# Patient Record
Sex: MALE | Race: WHITE | NOT HISPANIC OR LATINO | URBAN - METROPOLITAN AREA
[De-identification: names, ages, dates, MRNs, and addresses within clinical notes are randomized per-mention and may not be internally consistent; named-entity substitution may affect disease eponyms.]

---

## 2024-06-09 ENCOUNTER — EMERGENCY (EMERGENCY)
Facility: HOSPITAL | Age: 24
LOS: 1 days | Discharge: HOME | End: 2024-06-09
Admitting: EMERGENCY MEDICINE
Payer: COMMERCIAL

## 2024-06-09 VITALS
TEMPERATURE: 98 F | OXYGEN SATURATION: 98 % | HEART RATE: 62 BPM | SYSTOLIC BLOOD PRESSURE: 118 MMHG | RESPIRATION RATE: 18 BRPM | DIASTOLIC BLOOD PRESSURE: 79 MMHG
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
## 2024-06-09 DIAGNOSIS — S81.852A OPEN BITE, LEFT LOWER LEG, INITIAL ENCOUNTER: ICD-10-CM
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
## 2024-06-09 DIAGNOSIS — Z20.3 CONTACT WITH AND (SUSPECTED) EXPOSURE TO RABIES: ICD-10-CM

## 2024-06-09 DIAGNOSIS — Z23 ENCOUNTER FOR IMMUNIZATION: ICD-10-CM

## 2024-06-09 PROCEDURE — 99284 EMERGENCY DEPT VISIT MOD MDM: CPT

## 2024-06-09 RX ORDER — HUMAN RABIES VIRUS IMMUNE GLOBULIN 150 [IU]/ML
1650 INJECTION, SOLUTION INTRAMUSCULAR ONCE
Refills: 0 | Status: COMPLETED | OUTPATIENT
Start: 2024-06-09 | End: 2024-06-09

## 2024-06-09 RX ORDER — BACITRACIN ZINC 500 UNIT/G
1 OINTMENT IN PACKET (EA) TOPICAL ONCE
Refills: 0 | Status: COMPLETED | OUTPATIENT
Start: 2024-06-09 | End: 2024-06-09

## 2024-06-09 RX ORDER — RABIES VACC, HUMAN DIPLOID/PF 2.5 UNIT
1 VIAL (EA) INTRAMUSCULAR ONCE
Refills: 0 | Status: COMPLETED | OUTPATIENT
Start: 2024-06-09 | End: 2024-06-09

## 2024-06-09 RX ADMIN — HUMAN RABIES VIRUS IMMUNE GLOBULIN 1650 UNIT(S): 150 INJECTION, SOLUTION INTRAMUSCULAR at 22:23

## 2024-06-09 RX ADMIN — Medication 1 APPLICATION(S): at 22:21

## 2024-06-09 RX ADMIN — Medication 1 TABLET(S): at 22:21

## 2024-06-09 RX ADMIN — Medication 1 MILLILITER(S): at 22:26

## 2024-06-09 NOTE — ED PROVIDER NOTE - CLINICAL SUMMARY MEDICAL DECISION MAKING FREE TEXT BOX
24-year-old male, presents to the emergency department after sustaining a cat bite and scratch 36 hours ago while in [place].  Patient is up-to-date with tetanus after receiving a local pharmacy today.  He is noted to have 2 small wounds on the posterior left lower leg as well as a scratch along the anterior left lower leg.  Will plan to give p.o. Augmentin in-house as well as topical bacitracin to the area.  Will give patient rabies IgG and rabies vaccination.  He is given a specific schedule to return on days 3, 7 and 14.  Patient instructed to take antibiotics as prescribed.  Return precautions discussed and patient stable as he leaves.

## 2024-06-09 NOTE — DOWNTIME INTERRUPTION NOTE - WHICH MANUAL FORMS INITIATED?
Pt triaged during downtime, care provided during downtime, please refer to scanned items for downtime documentation and orders

## 2024-06-09 NOTE — ED ADULT TRIAGE NOTE - CHIEF COMPLAINT QUOTE
Pt triaged during downtime  Pt presents to ed for rabies vaccine, reports he was bitten by a stray cat x 36 hrs ago

## 2024-06-09 NOTE — ED ADULT NURSE NOTE - OBJECTIVE STATEMENT
Patient presents with complaints of cat bite wound to left calf and scratch promise to left lower leg 36 hrs ago. Bitten by stray cat. Here for rabies vaccine and immunoglobulin. denies fever, chills, drainage from wound.

## 2024-06-09 NOTE — ED PROVIDER NOTE - PHYSICAL EXAMINATION
VITAL SIGNS: I have reviewed nursing notes and confirm.  CONSTITUTIONAL: Well-developed; well-nourished; in no acute distress.  SKIN: 2 - 1cm LLE posterior abrasions/wounds w/ overlying scabs - no DC, very small 0.5cm area of light erythema. 4cm distal LLE linear scratch, no surrounding erythema. Calves soft, DPI. FROM of extremities.  HEAD: Normocephalic; atraumatic.  CARD: No extremity cyanosis. RRR, S1S2.  RESP: Speaks in full, clear sentences. CTA jeanette. No adventitious BS.  EXT: Moves all extremities normally.  NEURO: Alert, oriented. Grossly unremarkable. No focal deficits. Fluent speech.   PSYCH: Cooperative, appropriate. Mood and affect wnl.

## 2024-06-09 NOTE — ED PROVIDER NOTE - NS ED ROS FT
+cat bite/scratch  Denies fevers, chills, nausea, vomiting, diarrhea, constipation, abdominal pain, urinary symptoms, chest pain, palpitations, shortness of breath, dyspnea on exertion, syncope/near syncope, cough/URI symptoms, headache, weakness, numbness, focal deficits, visual changes, gait or balance changes, dizziness

## 2024-06-09 NOTE — ED PROVIDER NOTE - NSFOLLOWUPINSTRUCTIONS_ED_ALL_ED_FT
You will return to the emergency room on days 3, 7, and 14 for your subsequent vaccinations.  Please take the Augmentin as prescribed.  Return to the emergency room for any acute worsening symptoms.      Rabies  Rabies is an infection that affects the brain and central nervous system. It is caused by a virus that can be carried by many kinds of animals. The virus can spread from an infected animal to a person, most often through a bite.    If you have been bitten by an animal with rabies, it is very important that you get treatment right away. Infection almost always results in death, but early treatment may prevent an infection from developing.    What are the causes?  This condition is caused by a virus that can be carried by many kinds of animals, including dogs, cats, skunks, bats, woodchucks, raccoons, coyotes, and foxes. The virus spreads through the saliva of infected animals. Most people who get rabies get it from an animal bite.    What increases the risk?  The following factors may make you more likely to develop this condition:  Working with animals that may carry the rabies virus.  Working in a lab that handles samples of the rabies virus.  Coming in contact with the virus and having a weak body defense system (immune system) that may not be able to make enough antibodies in response to the vaccine treatment.  What are the signs or symptoms?  Symptoms of this condition usually start 1–3 months after you are bitten. By the time symptoms start, it is usually too late for lifesaving treatment.  Initial symptoms may include:  Headache.  Fever.  Fatigue and weakness.  As the disease progresses, symptoms may include:  Agitation and anxiety.  Confusion.  Unusual behavior, such as hyperactivity, fear of water (hydrophobia), or fear of air (aerophobia).  Hallucinations.  Insomnia.  Weakness in the arms or legs.  Difficulty swallowing.  Most people who are treated right away will never have symptoms.    How is this diagnosed?  This condition may be diagnosed based on:  Your history of exposure to animals and animal bites.  Your symptoms.  Saliva tests.  Blood tests.  Skin test. Skin samples are taken with a needle.  Spinal fluid test. Spinal fluid samples are taken with a needle that is inserted into your back (lumbar puncture).  How is this treated?  A person receiving an injection in the upper arm.   Treatment is often started right away, even if it is not known for sure if the animal that bit you has rabies. Treatment, called post-exposure prophylaxis (PEP), aims to prevent the infection from developing. It involves:  Cleaning the wound.  Having a series of rabies vaccine injections, usually given over a 2-week period.  Getting an injection to strengthen your body's defense against the rabies virus (immune globulin).  If the animal that bit you has been caught and is alive, it will be watched to see if it remains healthy. If the animal has been killed, it can be tested for rabies.    Follow these instructions at home:  Caring for your injury    Two stitched wounds. One is normal. The other is red with pus and infected.  Follow instructions from your health care provider about how to take care of your wound. Make sure you:  Wash your hands with soap and water for at least 20 seconds before and after you change your bandage (dressing). If soap and water are not available, use hand .  Change your dressing as told by your health care provider.  Leave stitches (sutures), skin glue, or adhesive strips in place. These skin closures may need to stay in place for 2 weeks or longer. If adhesive strip edges start to loosen and curl up, you may trim the loose edges. Do not remove adhesive strips completely unless your health care provider tells you to do that.  Keep the wound dry for as long as told by your health care provider.  Check your wound every day for signs of infection. Check for:  More redness, swelling, or pain.  Fluid or blood.  Warmth.  Pus or a bad smell.  Keep the wound raised (elevated) above the level of your heart as much as possible.  Rest the injured area. Do not use the injured area until your health care provider says it is okay.  Return to your normal activities as told by your health care provider. Ask your health care provider what activities are safe for you.  General instructions    If the animal that bit you was tested for rabies, it is up to you to get the test results. Ask your health care provider, or the department that is doing the test, when the results will be ready.  Take over-the-counter and prescription medicines only as told by your health care provider.  Keep all follow-up visits. This is important.  How is this prevented?  Stay away from stray or wild animals.  Get the rabies vaccine if you:  Plan to travel to an area where rabies is common.  Have a job or hobbies that involve possible contact with wild or stray animals.  Make sure your pet stays up to date with rabies vaccinations.  Watch your pets when they are outside. Keep them away from wild animals.  Report any stray animals to the local animal control services.  Get help right away if:  You are bitten by a wild or stray animal.  You have had any direct exposure to a bat.  You have any symptoms of rabies infection.  You have any of these signs of infection:  More redness, swelling, or pain around your wound.  Fluid or blood coming from your wound.  Warmth coming from your wound.  Pus or a bad smell coming from your wound.  A fever.  Summary  Rabies is an infection that affects the brain and central nervous system.  This condition is caused by a virus that can be carried by many kinds of animals, including dogs, cats, skunks, bats, woodchucks, raccoons, coyotes, and foxes.  The virus can spread from an infected animal's saliva to a person through a bite.  If you are bitten, get treatment right away. This may prevent an infection from developing. Symptoms of an infection usually do not start until 1–3 months after you are bitten. By then it may be too late for lifesaving treatment.  This information is not intended to replace advice given to you by your health care provider. Make sure you discuss any questions you have with your health care provider.

## 2024-06-09 NOTE — ED PROVIDER NOTE - PATIENT PORTAL LINK FT
You can access the FollowMyHealth Patient Portal offered by [facility] by registering at the following website: [email or web address]. By joining [employer]’s FollowMyHealth portal, you will also be able to view your health information using other applications (apps) compatible with our system.

## 2024-06-09 NOTE — ED ADULT TRIAGE NOTE - RESPIRATORY RATE (BREATHS/MIN)
Injection/Vaccine  Supervising Provider: Perla Santana NP  Reviewed allergies and possible side effects prior to injection. The patient was held following the injection for 10 minutes. No signs or symptoms of allergic reaction we observed. Patient tolerated procedure well. Educated patient to call office with any questions or concerns. Immunization information and VIS for shingrix  vaccine(s) was reviewed; verbal consent given by patient. 18

## 2024-06-09 NOTE — ED PROVIDER NOTE - OBJECTIVE STATEMENT
24-year-old male, denies past medical history, presenting to the emergency department after sustaining a cat bite and scratch while visiting [place].  The attack occurred 36 hours ago.  Patient arrived back to [place] today and was able to receive a tetanus shot by a local pharmacy.  He came to the emergency department in order to receive the rabies vaccination and immunoglobulin.  Denies fevers, chills, leg swelling, chest pain or shortness of breath.

## 2024-06-12 ENCOUNTER — EMERGENCY (EMERGENCY)
Facility: HOSPITAL | Age: 24
LOS: 1 days | Discharge: HOME | End: 2024-06-12
Attending: EMERGENCY MEDICINE | Admitting: EMERGENCY MEDICINE
Payer: COMMERCIAL

## 2024-06-12 VITALS
HEART RATE: 62 BPM | OXYGEN SATURATION: 97 % | SYSTOLIC BLOOD PRESSURE: 123 MMHG | WEIGHT: 179.9 LBS | TEMPERATURE: 98 F | RESPIRATION RATE: 15 BRPM | HEIGHT: 70 IN | DIASTOLIC BLOOD PRESSURE: 78 MMHG

## 2024-06-12 DIAGNOSIS — Z23 ENCOUNTER FOR IMMUNIZATION: ICD-10-CM

## 2024-06-12 DIAGNOSIS — Z20.3 CONTACT WITH AND (SUSPECTED) EXPOSURE TO RABIES: ICD-10-CM

## 2024-06-12 PROCEDURE — L9995: CPT

## 2024-06-12 RX ORDER — RABIES VACC, HUMAN DIPLOID/PF 2.5 UNIT
1 VIAL (EA) INTRAMUSCULAR ONCE
Refills: 0 | Status: COMPLETED | OUTPATIENT
Start: 2024-06-12 | End: 2024-06-12

## 2024-06-12 RX ADMIN — Medication 1 MILLILITER(S): at 08:35

## 2024-06-12 NOTE — ED PROVIDER NOTE - NSFOLLOWUPINSTRUCTIONS_ED_ALL_ED_FT
1. What is rabies?    Rabies is a serious disease. It is caused by a virus.  Rabies is mainly a disease of animals. Humans get rabies when they are bitten by infected animals.  At first there might not be any symptoms. But weeks, or even months after a bite, rabies can cause pain, fatigue, headaches, fever, and irritability. These are followed by seizures, hallucinations, and paralysis. Human rabies is almost always fatal.  Wild animals—especially bats—are the most common source of human rabies infection in the [place].   Skunks, raccoons, dogs, cats, coyotes, foxes and other mammals can also transmit the disease.  Human rabies is rare in the [place]. There have been only 55 cases diagnosed since 1990.   However, between 16,000 and 39,000 people are vaccinated each year as a precaution after animal bites. Also, rabies is far more common in other parts of the world, with about 40,000–70,000 rabies-related deaths worldwide each year. Bites from unvaccinated dogs cause most of these cases.  Rabies vaccine can prevent rabies.     2. Rabies vaccine  Rabies vaccine is given to people at high risk of rabies to protect them if they are exposed. It can also prevent the disease if it is given to a person after they have been exposed.  Rabies vaccine is made from killed rabies virus. It cannot cause rabies.    3. Who should get rabies vaccine and when?  Preventive vaccination (no exposure)     People at high risk of exposure to rabies, such as veterinarians, animal handlers, rabies laboratory workers, spelunkers, and rabies biologics production workers should be offered rabies vaccine.The vaccine should also be considered for:  People whose activities bring them into frequent contact with rabies virus or with possibly rabid animals.International travelers who are likely to come in contact with animals in parts of the world where rabies is common.The pre-exposure schedule for rabies vaccination is 3 doses, given at the following times:    Dose 1: As appropriateDose 2: 7 days after Dose 1Dose 3: 21 days or 28 days after Dose 1For laboratory workers and others who may be repeatedly exposed to rabies virus, periodic testing for immunity is recommended, and booster doses should be given as needed. (Testing or booster doses are not recommended for travelers.) Ask your doctor for details.    Vaccination after an exposure    Anyone who has been bitten by an animal, or who otherwise may have been exposed to rabies, should clean the wound and see a doctor immediately. The doctor will determine if they need to be vaccinated.  A person who is exposed and has never been vaccinated against rabies should get 4 doses of rabies vaccine—one dose right away and additional doses on the 3rd, 7th, and 14th days. They should also get another shot called Rabies Immune Globulin at the same time as the first dose.  A person who has been previously vaccinated should get 2 doses of rabies vaccine—one right away and another on the 3rd day. Rabies Immune Globulin is not needed.    4. Tell your doctor if...  Talk with a doctor before getting rabies vaccine if you:  ever had a serious (life-threatening) allergic reaction to a previous dose of rabies vaccine, or to any component of the vaccine; tell your doctor if you have any severe allergies,  have a weakened immune system because of:  HIV/AIDS, or another disease that affects the immune system,treatment with drugs that affect the immune system, such as steroids,cancer, or cancer treatment with radiation or drugs.If you have a minor illness, such as a cold, you can be vaccinated. If you are moderately or severely ill, you should probably wait until you recover before getting a routine (non-exposure) dose of rabies vaccine.  If you have been exposed to rabies virus, you should get the vaccine regardless of any other illnesses you may have.    5. What are the risks from rabies vaccine?  A vaccine, like any medicine, is capable of causing serious problems, such as severe allergic reactions. The risk of a vaccine causing serious harm, or death, is extremely small. Serious problems from rabies vaccine are very rare.  Mild problems : Soreness, redness, swelling, or itching where the shot was given (30%–74%)headache, nausea, abdominal pain, muscle aches, dizziness (5%–40%)    Moderate problems : Hives, pain in the joints, fever (about 6% of booster doses)Other nervous system disorders, such as Guillain–Barré syndrome (GBS), have been reported after rabies vaccine, but this happens so rarely that it is not known whether they are related to the vaccine.    NOTE: Several brands of rabies vaccine are available in the [place], and reactions may vary between brands. Your provider can give you more information about a particular brand.    6. What if there is a serious reaction?  What should I look for?     Look for anything that concerns you, such as signs of a severe allergic reaction, very high fever, or behavior changes.Signs of a severe allergic reaction can include hives, swelling of the face and throat, difficulty breathing, a fast heartbeat, dizziness, and weakness. These would start a few minutes to a few hours after the vaccination.    What should I do?   If you think it is a severe allergic reaction or other emergency that can't wait, call 9-1-1 or get the person to the nearest hospital. Otherwise, call your doctor.Afterward, the reaction should be reported to the Vaccine Adverse Event Reporting System (VAERS). Your doctor might file this report, or you can do it yourself through the VAERS web site at www.vaers.hhs.gov, or by calling 1-508.676.8033.

## 2024-06-12 NOTE — ED ADULT NURSE NOTE - CAS EDP DISCH TYPE
Home No - the patient is unable to be screened due to medical condition ROS: CONTUSIONAL: Denies fever, chills, fatigue, wt loss. HEAD: Denies trauma, HA, Dizziness. EYE: Denies Acute visual changes, diplopia. ENMT: Denies change in hearing, tinnitus, epistaxis, difficulty swallowing, sore throat. CARDIO: Denies CP, palpitations, edema. RESP: Denies Cough, SOB , Diff breathing, hemoptysis. GI: Denies N/V, ABD pain, change in bowel movement. URINARY: Denies difficulty urinating, pelvic pain. MS:  Denies joint pain, back pain, weakness, decreased ROM, swelling. NEURO: Denies change in gait, seizures, loss of sensation, dizziness, confusion LOC.  PSY: NO SI/HI. SKIN: skin collection  Denies Rash, bruising.

## 2024-06-12 NOTE — ED PROVIDER NOTE - PATIENT PORTAL LINK FT
You can access the FollowMyHealth Patient Portal offered by [facility] by registering at the following website: [email or web address]. By joining [employer]’s FollowMyHealth portal, you will also be able to view your health information using other applications (apps) compatible with our system.

## 2024-06-12 NOTE — ED ADULT NURSE NOTE - NS ED NURSE LEVEL OF CONSCIOUSNESS ORIENTATION
reports recent AH but not responding to internal stimuli at this time
Oriented - self; Oriented - place; Oriented - time

## 2024-06-16 ENCOUNTER — EMERGENCY (EMERGENCY)
Facility: HOSPITAL | Age: 24
LOS: 1 days | Discharge: HOME | End: 2024-06-16
Attending: EMERGENCY MEDICINE | Admitting: EMERGENCY MEDICINE
Payer: COMMERCIAL

## 2024-06-16 VITALS
TEMPERATURE: 98 F | SYSTOLIC BLOOD PRESSURE: 132 MMHG | HEIGHT: 70 IN | HEART RATE: 66 BPM | RESPIRATION RATE: 17 BRPM | OXYGEN SATURATION: 98 % | DIASTOLIC BLOOD PRESSURE: 84 MMHG

## 2024-06-16 PROCEDURE — L9995: CPT

## 2024-06-16 RX ORDER — RABIES VACC, HUMAN DIPLOID/PF 2.5 UNIT
1 VIAL (EA) INTRAMUSCULAR ONCE
Refills: 0 | Status: COMPLETED | OUTPATIENT
Start: 2024-06-16 | End: 2024-06-16

## 2024-06-16 RX ADMIN — Medication 1 MILLILITER(S): at 19:07

## 2024-06-16 NOTE — ED ADULT NURSE NOTE - NSFALLUNIVINTERV_ED_ALL_ED
Bed/Stretcher in lowest position, wheels locked, appropriate side rails in place/Call bell, personal items and telephone in reach/Instruct patient to call for assistance before getting out of bed/chair/stretcher/Non-slip footwear applied when patient is off stretcher/[place] to call system/Physically safe environment - no spills, clutter or unnecessary equipment/Purposeful proactive rounding/Room/bathroom lighting operational, light cord in reach

## 2024-06-16 NOTE — ED PROVIDER NOTE - NSFOLLOWUPINSTRUCTIONS_ED_ALL_ED_FT
Please come back next Sunday for your next Rabies shot. Please keep your wound clean and dry. Come back if you notice signs of infection (redness, swelling, or pus).

## 2024-06-16 NOTE — ED PROVIDER NOTE - NSICDXNOPASTMEDICALHX_GEN_ALL_ED
Left femoral artery. Accessed successfully. using ultrasound guidance. <-- Click to add NO pertinent Past Medical History

## 2024-06-16 NOTE — ED PROVIDER NOTE - CLINICAL SUMMARY MEDICAL DECISION MAKING FREE TEXT BOX
Wound appears clean. It does not appear infected. Pt has no symptoms of rabies. He received his third shot.

## 2024-06-16 NOTE — ED PROVIDER NOTE - PATIENT PORTAL LINK FT
You can access the FollowMyHealth Patient Portal offered by [facility] by registering at the following website: [email or web address]. By joining [employer]’s FollowMyHealth portal, you will also be able to view your health information using other applications (apps) compatible with our system.

## 2024-06-16 NOTE — ED ADULT NURSE NOTE - OBJECTIVE STATEMENT
"pt reports for 3rd rabies shot. no complaints."    pt stable, not in any acute distress. care ongoing.

## 2024-06-16 NOTE — ED PROVIDER NOTE - OBJECTIVE STATEMENT
Patient is a healthy 24-year-old man. He was scratched and bitten by a wild cat in [place]. He received his first rabies shot on June 9th and is here for his third shot. Pt reports the wound on his left anterior ankle has been healing well. No other complaints.

## 2024-06-16 NOTE — ED PROVIDER NOTE - PHYSICAL EXAMINATION
*  Normocephalic, atraumatic  EOMI, pupils equal  Normal extremities, no deformity, no calf tenderness, no edema  Thin eschar noted left anterior ankle without significant erythema, edema, or pus  GCS 15,  normal speech, steady gait    Normal mood and affect

## 2024-06-17 PROBLEM — Z78.9 OTHER SPECIFIED HEALTH STATUS: Chronic | Status: ACTIVE | Noted: 2024-06-12

## 2024-06-18 DIAGNOSIS — Z23 ENCOUNTER FOR IMMUNIZATION: ICD-10-CM

## 2024-06-18 DIAGNOSIS — S91.052D: ICD-10-CM

## 2024-06-18 DIAGNOSIS — Z20.3 CONTACT WITH AND (SUSPECTED) EXPOSURE TO RABIES: ICD-10-CM

## 2024-06-26 ENCOUNTER — EMERGENCY (EMERGENCY)
Facility: HOSPITAL | Age: 24
LOS: 1 days | Discharge: HOME | End: 2024-06-26
Admitting: EMERGENCY MEDICINE
Payer: COMMERCIAL

## 2024-06-26 VITALS
HEART RATE: 61 BPM | RESPIRATION RATE: 18 BRPM | OXYGEN SATURATION: 96 % | DIASTOLIC BLOOD PRESSURE: 79 MMHG | HEIGHT: 70 IN | TEMPERATURE: 98 F | SYSTOLIC BLOOD PRESSURE: 127 MMHG

## 2024-06-26 DIAGNOSIS — Z20.3 CONTACT WITH AND (SUSPECTED) EXPOSURE TO RABIES: ICD-10-CM

## 2024-06-26 DIAGNOSIS — Z23 ENCOUNTER FOR IMMUNIZATION: ICD-10-CM

## 2024-06-26 PROCEDURE — L9995: CPT

## 2024-06-26 RX ORDER — RABIES VACC, HUMAN DIPLOID/PF 2.5 UNIT
1 VIAL (EA) INTRAMUSCULAR ONCE
Refills: 0 | Status: COMPLETED | OUTPATIENT
Start: 2024-06-26 | End: 2024-06-26

## 2024-06-26 RX ADMIN — Medication 1 MILLILITER(S): at 13:26

## 2024-06-26 NOTE — ED PROVIDER NOTE - CLINICAL SUMMARY MEDICAL DECISION MAKING FREE TEXT BOX
24-year-old male, presents to the ED for his fourth and final rabies vaccination.  Patient denies any active complaints at this time.  Vaccination administered and patient tolerated well.  [place] GREEN

## 2024-06-26 NOTE — ED ADULT NURSE NOTE - NSFALLUNIVINTERV_ED_ALL_ED
Bed/Stretcher in lowest position, wheels locked, appropriate side rails in place/Call bell, personal items and telephone in reach/Instruct patient to call for assistance before getting out of bed/chair/stretcher/Non-slip footwear applied when patient is off stretcher/[place] to call system/Physically safe environment - no spills, clutter or unnecessary equipment/Purposeful proactive rounding/Room/bathroom lighting operational, light cord in reach

## 2024-06-26 NOTE — ED PROVIDER NOTE - PATIENT PORTAL LINK FT
You can access the FollowMyHealth Patient Portal offered by [facility] by registering at the following website: [email or web address]. By joining [employer]’s FollowMyHealth portal, you will also be able to view your health information using other applications (apps) compatible with our system.

## 2024-08-19 ENCOUNTER — EMERGENCY (EMERGENCY)
Facility: HOSPITAL | Age: 24
LOS: 1 days | Discharge: ANOTHER HOSPITAL | End: 2024-08-19
Attending: EMERGENCY MEDICINE | Admitting: EMERGENCY MEDICINE
Payer: COMMERCIAL

## 2024-08-19 ENCOUNTER — EMERGENCY (EMERGENCY)
Facility: HOSPITAL | Age: 24
LOS: 1 days | Discharge: HOME | End: 2024-08-19
Attending: STUDENT IN AN ORGANIZED HEALTH CARE EDUCATION/TRAINING PROGRAM | Admitting: STUDENT IN AN ORGANIZED HEALTH CARE EDUCATION/TRAINING PROGRAM
Payer: COMMERCIAL

## 2024-08-19 VITALS
HEIGHT: 70 IN | WEIGHT: 179.9 LBS | RESPIRATION RATE: 15 BRPM | SYSTOLIC BLOOD PRESSURE: 121 MMHG | TEMPERATURE: 98 F | DIASTOLIC BLOOD PRESSURE: 82 MMHG | OXYGEN SATURATION: 98 % | HEART RATE: 62 BPM

## 2024-08-19 VITALS
TEMPERATURE: 98 F | DIASTOLIC BLOOD PRESSURE: 78 MMHG | HEART RATE: 62 BPM | OXYGEN SATURATION: 98 % | SYSTOLIC BLOOD PRESSURE: 135 MMHG | RESPIRATION RATE: 16 BRPM | WEIGHT: 179.9 LBS | HEIGHT: 70 IN

## 2024-08-19 VITALS
RESPIRATION RATE: 13 BRPM | OXYGEN SATURATION: 98 % | TEMPERATURE: 98 F | DIASTOLIC BLOOD PRESSURE: 79 MMHG | SYSTOLIC BLOOD PRESSURE: 149 MMHG | HEART RATE: 64 BPM

## 2024-08-19 DIAGNOSIS — R20.2 PARESTHESIA OF SKIN: ICD-10-CM

## 2024-08-19 LAB
ALBUMIN SERPL ELPH-MCNC: 4.4 G/DL — SIGNIFICANT CHANGE UP (ref 3.4–5)
ALP SERPL-CCNC: 81 U/L — SIGNIFICANT CHANGE UP (ref 40–120)
ALT FLD-CCNC: 27 U/L — SIGNIFICANT CHANGE UP (ref 12–42)
ANION GAP SERPL CALC-SCNC: 8 MMOL/L — LOW (ref 9–16)
APTT BLD: 33.7 SEC — SIGNIFICANT CHANGE UP (ref 24.5–35.6)
AST SERPL-CCNC: 16 U/L — SIGNIFICANT CHANGE UP (ref 15–37)
BASOPHILS # BLD AUTO: 0.06 K/UL — SIGNIFICANT CHANGE UP (ref 0–0.2)
BASOPHILS NFR BLD AUTO: 1 % — SIGNIFICANT CHANGE UP (ref 0–2)
BILIRUB SERPL-MCNC: 0.6 MG/DL — SIGNIFICANT CHANGE UP (ref 0.2–1.2)
BUN SERPL-MCNC: 13 MG/DL — SIGNIFICANT CHANGE UP (ref 7–23)
CALCIUM SERPL-MCNC: 9.7 MG/DL — SIGNIFICANT CHANGE UP (ref 8.5–10.5)
CHLORIDE SERPL-SCNC: 105 MMOL/L — SIGNIFICANT CHANGE UP (ref 96–108)
CO2 SERPL-SCNC: 29 MMOL/L — SIGNIFICANT CHANGE UP (ref 22–31)
CREAT SERPL-MCNC: 0.89 MG/DL — SIGNIFICANT CHANGE UP (ref 0.5–1.3)
CRP SERPL-MCNC: <0.5 MG/L — SIGNIFICANT CHANGE UP (ref 0.5–3)
EGFR: 122 ML/MIN/1.73M2 — SIGNIFICANT CHANGE UP
EOSINOPHIL # BLD AUTO: 0.13 K/UL — SIGNIFICANT CHANGE UP (ref 0–0.5)
EOSINOPHIL NFR BLD AUTO: 2.2 % — SIGNIFICANT CHANGE UP (ref 0–6)
GLUCOSE SERPL-MCNC: 100 MG/DL — HIGH (ref 70–99)
HCT VFR BLD CALC: 45.7 % — SIGNIFICANT CHANGE UP (ref 39–50)
HGB BLD-MCNC: 15.6 G/DL — SIGNIFICANT CHANGE UP (ref 13–17)
IMM GRANULOCYTES NFR BLD AUTO: 0.2 % — SIGNIFICANT CHANGE UP (ref 0–0.9)
INR BLD: 0.99 — SIGNIFICANT CHANGE UP (ref 0.85–1.18)
LYMPHOCYTES # BLD AUTO: 2.08 K/UL — SIGNIFICANT CHANGE UP (ref 1–3.3)
LYMPHOCYTES # BLD AUTO: 35.3 % — SIGNIFICANT CHANGE UP (ref 13–44)
MAGNESIUM SERPL-MCNC: 2 MG/DL — SIGNIFICANT CHANGE UP (ref 1.6–2.6)
MCHC RBC-ENTMCNC: 28.9 PG — SIGNIFICANT CHANGE UP (ref 27–34)
MCHC RBC-ENTMCNC: 34.1 GM/DL — SIGNIFICANT CHANGE UP (ref 32–36)
MCV RBC AUTO: 84.6 FL — SIGNIFICANT CHANGE UP (ref 80–100)
MONOCYTES # BLD AUTO: 0.66 K/UL — SIGNIFICANT CHANGE UP (ref 0–0.9)
MONOCYTES NFR BLD AUTO: 11.2 % — SIGNIFICANT CHANGE UP (ref 2–14)
NEUTROPHILS # BLD AUTO: 2.95 K/UL — SIGNIFICANT CHANGE UP (ref 1.8–7.4)
NEUTROPHILS NFR BLD AUTO: 50.1 % — SIGNIFICANT CHANGE UP (ref 43–77)
NRBC # BLD: 0 /100 WBCS — SIGNIFICANT CHANGE UP (ref 0–0)
PLATELET # BLD AUTO: 254 K/UL — SIGNIFICANT CHANGE UP (ref 150–400)
POTASSIUM SERPL-MCNC: 3.9 MMOL/L — SIGNIFICANT CHANGE UP (ref 3.5–5.3)
POTASSIUM SERPL-SCNC: 3.9 MMOL/L — SIGNIFICANT CHANGE UP (ref 3.5–5.3)
PROT SERPL-MCNC: 8.2 G/DL — SIGNIFICANT CHANGE UP (ref 6.4–8.2)
PROTHROM AB SERPL-ACNC: 11.2 SEC — SIGNIFICANT CHANGE UP (ref 9.5–13)
RBC # BLD: 5.4 M/UL — SIGNIFICANT CHANGE UP (ref 4.2–5.8)
RBC # FLD: 11.4 % — SIGNIFICANT CHANGE UP (ref 10.3–14.5)
SODIUM SERPL-SCNC: 142 MMOL/L — SIGNIFICANT CHANGE UP (ref 132–145)
WBC # BLD: 5.89 K/UL — SIGNIFICANT CHANGE UP (ref 3.8–10.5)
WBC # FLD AUTO: 5.89 K/UL — SIGNIFICANT CHANGE UP (ref 3.8–10.5)

## 2024-08-19 PROCEDURE — 70496 CT ANGIOGRAPHY HEAD: CPT | Mod: 26,MC

## 2024-08-19 PROCEDURE — 96374 THER/PROPH/DIAG INJ IV PUSH: CPT | Mod: XU

## 2024-08-19 PROCEDURE — 99285 EMERGENCY DEPT VISIT HI MDM: CPT

## 2024-08-19 PROCEDURE — 99284 EMERGENCY DEPT VISIT MOD MDM: CPT | Mod: 25

## 2024-08-19 PROCEDURE — A9585: CPT

## 2024-08-19 PROCEDURE — 70553 MRI BRAIN STEM W/O & W/DYE: CPT | Mod: 26,MC

## 2024-08-19 PROCEDURE — 70553 MRI BRAIN STEM W/O & W/DYE: CPT | Mod: MC

## 2024-08-19 PROCEDURE — 70498 CT ANGIOGRAPHY NECK: CPT | Mod: 26,MC

## 2024-08-19 RX ORDER — LORAZEPAM 1 MG/1
0.25 TABLET ORAL ONCE
Refills: 0 | Status: DISCONTINUED | OUTPATIENT
Start: 2024-08-19 | End: 2024-08-19

## 2024-08-19 RX ORDER — ALPRAZOLAM 0.5 MG
0.25 TABLET ORAL ONCE
Refills: 0 | Status: DISCONTINUED | OUTPATIENT
Start: 2024-08-19 | End: 2024-08-19

## 2024-08-19 RX ORDER — BACTERIOSTATIC SODIUM CHLORIDE 0.9 %
1000 VIAL (ML) INJECTION ONCE
Refills: 0 | Status: COMPLETED | OUTPATIENT
Start: 2024-08-19 | End: 2024-08-19

## 2024-08-19 RX ADMIN — LORAZEPAM 0.25 MILLIGRAM(S): 1 TABLET ORAL at 22:46

## 2024-08-19 RX ADMIN — Medication 2000 MILLILITER(S): at 14:38

## 2024-08-19 NOTE — ED ADULT NURSE NOTE - CAS DISCH TRANSFER METHOD
Private car Posterior Auricular Interpolation Flap Text: A decision was made to reconstruct the defect utilizing an interpolation axial flap and a staged reconstruction.  A telfa template was made of the defect.  This telfa template was then used to outline the posterior auricular interpolation flap.  The donor area for the pedicle flap was then injected with anesthesia.  The flap was excised through the skin and subcutaneous tissue down to the layer of the underlying musculature.  The pedicle flap was carefully excised within this deep plane to maintain its blood supply.  The edges of the donor site were undermined.   The donor site was closed in a primary fashion.  The pedicle was then rotated into position and sutured.  Once the tube was sutured into place, adequate blood supply was confirmed with blanching and refill.  The pedicle was then wrapped with xeroform gauze and dressed appropriately with a telfa and gauze bandage to ensure continued blood supply and protect the attached pedicle.

## 2024-08-19 NOTE — ED PROVIDER NOTE - OBJECTIVE STATEMENT
24-year-old man complaining of gradual increase in tingling over the left side of his body.  He feels it in the leg toes arm fingers and side of his face.  Especially on the side of his cheek. Has no other neurologic symptoms.  The symptoms seem to come and go and are more like a tingling.  He is also had some twitching of the left eyelid.  He reports not being under more stress.  He has never had any other unusual neurologic symptoms in the past and the symptoms are not made worse by exercise hot baths or showers.  Of note he was seen here about 2 months ago and received the rabies vaccination series following a cat bite and scratch that happened in [place].  He was otherwise well since.

## 2024-08-19 NOTE — ED ADULT TRIAGE NOTE - CHIEF COMPLAINT QUOTE
BIBEMS as transfer from [facility]: c/o intermittent left sided paraesthesias, had a negative CT and preliminary stroke workup, sent here for MRI and neuro c/s for dispo.

## 2024-08-19 NOTE — CONSULT NOTE ADULT - ASSESSMENT
25 yo M w/ no significant PMH, recent travel to [place] scratched and bitten by a wild cat s/p rabies vaccination presents for left side intermittent numbness/tingling sensation for 1 week. On exam, he has mildly decreased sensation on the L face, but neuro exam otherwise is intact. Demyelinating process should be ruled out.    Recommendations  - Obtain MRI Brain w/wo Contrast  - If MRI is negative, can follow up Neurology outpatient    Case discussed with attending Dr. Ramos.

## 2024-08-19 NOTE — ED ADULT NURSE NOTE - NS ED NOTE  TALK SOMEONE YN
No Plan: Physician Orders: Radiotherapy Treatment Plan: Superficial Radiotherapy with Ultrasound\\nPlan: This patient has been treated today with image-guided superficial radiation therapy for non-melanoma \\nskin cancer. Written informed consent has been previously obtained from this patient for this treatment. This consent is documented in the patient's chart. The patient gave verbal consent to continue treatment today. \\nThe patient was treated with a specific radiation dose and setup as prescribed by the provider listed on this visit note. A Radiation Therapist performed administration of radiation under the supervision of a provider. \\nThe treatment parameters and cumulative dose are indicated above. Prior to administering the radiation, the patient underwent a verification therapeutic radiology simulation-aided field setting defining relevant normal and abnormal target anatomy and acquiring images with a separate and distinct diagnostic high-frequency \\nultrasound to delineate tissues and determine whether to proceed with delivery of therapeutic, in addition to retrieve data necessary to develop an optimal radiation treatment process for the patient. The field placement simulation documents any change seen in overall tumor volume documented in the patient’s record, allows the clinician to indicate any needed changes in the treatment plan and/or prescription, provides diagnostic evaluation as the basis for performing the therapeutic procedure, and clearly identifies the information needed to decide to proceed with the therapeutic procedure. This process includes verification of the treatment port and proper treatment positioning. All treatment ports were \\nphotographed within electronic medical records. The patient's lead blocking along with gross tumor volume and margin was confirmed. Considering superficial radiotherapy is clinical in setup, this requires the physician and radiation therapist to clarify the location interest being treated against initial images, ultrasound, pathology, and patient anatomy. Care was taken to ensure vera treated were geometrically accurate and properly positioned using therapeutic radiology simulation-aided field setting verification per fraction. This \\nprocess is also utilized to determine if any prescription or setup changes are necessary. These steps are therefore medically necessary to ensure safe and effective administration of radiation. Ongoing therapeutic radiology simulation-aided field setting verification is ordered throughout the course of therapy.\\nA high-frequency ultrasound image was acquired today for a two-dimensional evaluation of the tumor volume, depth, width, breadth, review of prior response to treatment, provide geometric accuracy of field placement, and determine whether to proceed with therapeutic delivery.\\n\\nHigh frequency ultrasound depth is 1.49mm, which is -0.34mm in difference from previous imaging.\\n\\nThe field placement and ultrasound imaging, per fraction, is separate and distinct from the initial simulation and is an important task in providing safe administration of superficial radiation therapy. Physician evaluation of the ultrasound information will be ongoing throughout the course of treatment and is deemed medically \\nnecessary to ensure the efficacy of treatment, whether to proceed with therapeutic delivery, and determine any necessary changes. Today's images were evaluated for determination of continuation of treatment with the current plan or with necessary changes as appropriate. According to the review of verification \\ntherapeutic radiology simulation-aided field setting and imaging, no change is required. \\nAdditionally, the use of ultrasound visualization and targeted assessment allows the patient to be able to see his cancer progress, encouraging the patient to complete and maintain compliance through the full course of prescribed radiotherapy. Per Dr. Terry Garcia, continued ultrasound guidance and therapeutic radiology simulation-aided field setting verification per fraction is required for field placement, measurement of tumor depth, tissues evaluation, progress, acute effect monitoring, and determination for therapeutic treatment delivery is appropriate. Detail Level: Zone

## 2024-08-19 NOTE — ED ADULT NURSE NOTE - NSFALLUNIVINTERV_ED_ALL_ED
Bed/Stretcher in lowest position, wheels locked, appropriate side rails in place/Call bell, personal items and telephone in reach/Instruct patient to call for assistance before getting out of bed/chair/stretcher/Non-slip footwear applied when patient is off stretcher/[place] to call system/Physically safe environment - no spills, clutter or unnecessary equipment/Purposeful proactive rounding/Room/bathroom lighting operational, light cord in reach

## 2024-08-19 NOTE — ED ADULT NURSE NOTE - NS ED NURSE IV DC DT
Medication:   meloxicam (MOBIC) 15 MG tablet   metoPROLOL succinate (TOPROL-XL) 25 MG 24 hr tablet   atorvastatin (LIPITOR) 10 MG tablet   amLODIPine (NORVASC) 10 MG tablet   allopurinol (ZYLOPRIM) 100 MG tablet     Last office visit date: 3/18/2024  Medication Refill Protocol Failed.  Failed criteria: NSAID Refill Protocol. Sent to clinician to review.       NSAID Refill Protocol - 12 Month Protocol [id number]/22/2024 09:29 AM   Protocol Details Patient is less than 69 years old    Seen by prescribing provider or same department within the last 12 months or has a future appt in 3 months - IF FAILED PLEASE LOOK AT CHART REVIEW FOR LAST VISIT AND PROCEED ACCORDINGLY    eGFR greater than 29 within last 12 months looking at last value    AST less than 55 in past 12 months looking at last value    HGB greater than 10 and HCT greater than 30 in past 12 months looking at last value    Normal Potassium within last 12 months looking at last value    ALT less than 90 in past 12 months looking at last value    Medication (including dose and sig) on current meds list    Request is NOT for Ketorolac    Lab requirements -- IF CRITERIA FAILED REFER TO PROTOCOL DETAILS        
20-Aug-2024 02:03

## 2024-08-19 NOTE — ED ADULT NURSE NOTE - OBJECTIVE STATEMENT
24y male referred to ED from [facility] for MRI and neuro consult. Pt states he has had left sided paresthesias on face, arms, and legs intermittent for 6 days. Today paresthesias present all day and not improving. Pt recently in [place] and was bit by a dog and got a rabies shot in [place]. Pt denies numbness/weakness/pain/pmh/daily meds. A&Ox4.

## 2024-08-19 NOTE — ED ADULT NURSE NOTE - NSFALLUNIVINTERV_ED_ALL_ED
Bed/Stretcher in lowest position, wheels locked, appropriate side rails in place/Call bell, personal items and telephone in reach/Instruct patient to call for assistance before getting out of bed/chair/stretcher/Non-slip footwear applied when patient is off stretcher/[place] to call system/Physically safe environment - no spills, clutter or unnecessary equipment/Purposeful proactive rounding/Room/bathroom lighting operational, light cord in reach

## 2024-08-19 NOTE — ED PROVIDER NOTE - PHYSICAL EXAMINATION
VITAL SIGNS: I have reviewed nursing notes and confirm.   CONST: Well-developed; well-nourished; No apparent distress.  ENT: No nasal discharge; airway clear.  HEAD: Normocephalic; atraumatic.  EYES: Sclera clear. Pupils round and symmetrical bilaterally.  CARD: S1, S2 normal; no murmurs, gallops, or rubs. Regular rate and rhythm.  RESP: No wheezes, rales or rhonchi. Breathing comfortably; speaking in full sentences.   ABD: Soft; non-distended; non-tender; no rebound or guarding.  : No CVA tenderness bilaterally.  MSK: No acute deformities noted to extremities. No tenderness to cervical/thoracic/lumbar spine.  NEURO: Alert, oriented. Speech is fluent and appropriate. Moving all extremities appropriately. No gross motor or sensory abnormalities. + subjective on/off tingling on L side of body (face, arm, leg) See NIHSS 0  SKIN: Skin is normal temperature; no diaphoresis; no pallor.   PSYCH: Cooperative. Appropriate mood, language, and behavior.

## 2024-08-19 NOTE — ED PROVIDER NOTE - CLINICAL SUMMARY MEDICAL DECISION MAKING FREE TEXT BOX
24-year-old man presenting with paresthesias to the left side of his body in the setting of getting the rabies vaccines 2 months ago.  Differential diagnosis includes an atypical manifestation of stroke [seems unlikely given the gradual onset in the coming and going nature, a demyelinating process like MS, other CNS processes and less likely Guillain-Barré.  Will send screening labs and check a CT of the head as well as CT angiogram.  Will discuss with stroke neurology and/or neurology.

## 2024-08-19 NOTE — ED PROVIDER NOTE - PROGRESS NOTE DETAILS
I spoke with Dr. Peck of stroke neurology.  She thinks that the symptoms are not consistent with stroke.  The imaging was within normal limits.  We are awaiting a callback from general neurology. Stroke code was never activated and as such was never canceled.  Used the template of the stroke note to document the NIHSS. I spoke to Dr. Vasquez of neurology.  He recommended transferring the patient to the [facility] ED for MRI.  If demyelinating process is discovered they may consider admitting for treatment. I spoke to Dr. Vasquez of neurology.  He recommended transferring the patient to the [facility] ED for MRI.  If demyelinating process is discovered they may consider admitting for treatment. The patietn was accepted for transfer by Dr. Castano of the ED.

## 2024-08-19 NOTE — CONSULT NOTE ADULT - NSCONSULTADDITIONALINFOA_GEN_ALL_CORE
attending attestation: pt not seen personally but discussed with resident and agree with plan above.

## 2024-08-19 NOTE — CONSULT NOTE ADULT - SUBJECTIVE AND OBJECTIVE BOX
NEUROLOGY CONSULT    HPI:  23 yo M w/ no significant PMH, recent travel to [place] scratched and bitten by a wild cat s/p rabies vaccination presents for left side numbness/tingling. Patient started to notice L facial numbness/tingling sensation one week ago, lasting for 4-5 seconds, 4-5 times a day, sometimes with L arm and hand tingling and L trunk and L leg tingling as well, more common in the L face and L arm. In the past 2 days, the tingling sensation has been happening more frequently. Patient reports he had 20 episodes in a period of 30 min. Denies muscle weakness, vision problems, hearing problems, dizziness, headaches. Patient does not recall any triggering events associated with these episodes.    MEDICATIONS  Home Medications:    MEDICATIONS  (STANDING):    MEDICATIONS  (PRN):      FAMILY HISTORY:    SOCIAL HISTORY: negative for tobacco, alcohol, or ilicit drug use.    Allergies    No Known Allergies    Intolerances      Neurological Examination:  General:  Appearance is consistent with chronologic age.   Cognitive/Language:  Awake, alert, and oriented to person, place, time and date.  Recent and remote memory intact.  Fund of knowledge is appropriate.  Naming, repetition and comprehension intact. Nondysarthric.    Cranial Nerves  - Eyes: Visual fields full. EOMI w/o nystagmus, skew or reported double vision.  PERRL.  No ptosis/weakness of eyelid closure.    - Face: Mildly decreased sensation on L frontal area of the face.  Facial sensation normal V2 - 3, no facial asymmetry.    - Ears/Nose/Throat:  Hearing grossly intact b/l to finger rub.  Palate elevates midline.  Tongue and uvula midline.   Motor exam: Normal tone and bulk. No tenderness, twitching, tremors or involuntary movements.            Upper extremity                  Bicep     Tricep     HG                                                 R      5/5        5/5          5/5                                                    L       5/5        5/5          5/5              Lower extremity                   HF        KE        DF         PF                                                  R     5/5       5/5       5/5       5/5                                               L      5/5      5/5       5/5        5/5    Sensory examination: Intact to light touch and pinprick, pain, temperature and vibration in all extremities.  Reflexes: 2+ b/l biceps, triceps, patella and achilles.  Plantar response downgoing b/l.  Cerebellum: FTN/HKS intact.  No dysmetria.  Gait narrow based and normal.    LABS:                        15.6   5.89  )-----------( 254      ( 19 Aug 2024 14:05 )             45.7     08-19    142  |  105  |  13  ----------------------------<  100<H>  3.9   |  29  |  0.89    Ca    9.7      19 Aug 2024 14:05  Mg     2.0     08-19    TPro  8.2  /  Alb  4.4  /  TBili  0.6  /  DBili  x   /  AST  16  /  ALT  27  /  AlkPhos  81  08-19    Hemoglobin A1C:   Vitamin B12   PT/INR - ( 19 Aug 2024 14:05 )   PT: 11.2 sec;   INR: 0.99          PTT - ( 19 Aug 2024 14:05 )  PTT:33.7 sec  CAPILLARY BLOOD GLUCOSE      Urinalysis Basic - ( 19 Aug 2024 14:05 )    Color: x / Appearance: x / SG: x / pH: x  Gluc: 100 mg/dL / Ketone: x  / Bili: x / Urobili: x   Blood: x / Protein: x / Nitrite: x   Leuk Esterase: x / RBC: x / WBC x   Sq Epi: x / Non Sq Epi: x / Bacteria: x      RADIOLOGY, EKG AND ADDITIONAL TESTS:  CTH no abnormalities  CTA no stenosis, occlusion, aneurysms           NEUROLOGY CONSULT    HPI:  23 yo M w/ no significant PMH, recent travel to [place] scratched and bitten by a wild cat s/p rabies vaccination presents for left side numbness/tingling. Patient started to notice L facial numbness/tingling sensation one week ago, lasting for 4-5 seconds, 4-5 times a day, sometimes with L arm and hand tingling and L trunk and L leg tingling as well, more common in the L face and L arm. In the past 2 days, the tingling sensation has been happening more frequently. Patient reports today he had 20 episodes in a period of 30 min. Denies muscle weakness, vision problems, hearing problems, dizziness, headaches. Patient does not recall any triggering events associated with these episodes.    MEDICATIONS  Home Medications:    MEDICATIONS  (STANDING):    MEDICATIONS  (PRN):      FAMILY HISTORY:    SOCIAL HISTORY: negative for tobacco, alcohol, or ilicit drug use.    Allergies    No Known Allergies    Intolerances      Neurological Examination:  General:  Appearance is consistent with chronologic age.   Cognitive/Language:  Awake, alert, and oriented to person, place, time and date.  Recent and remote memory intact.  Fund of knowledge is appropriate.  Naming, repetition and comprehension intact. Nondysarthric.    Cranial Nerves  - Eyes: Visual fields full. EOMI w/o nystagmus, skew or reported double vision.  PERRL.  No ptosis/weakness of eyelid closure.    - Face: Mildly decreased sensation on L frontal area of the face.  Facial sensation normal V2 - 3, no facial asymmetry.    - Ears/Nose/Throat:  Hearing grossly intact b/l to finger rub.  Palate elevates midline.  Tongue and uvula midline.   Motor exam: Normal tone and bulk. No tenderness, twitching, tremors or involuntary movements.            Upper extremity                  Bicep     Tricep     HG                                                 R      5/5        5/5          5/5                                                    L       5/5        5/5          5/5              Lower extremity                   HF        KE        DF         PF                                                  R     5/5       5/5       5/5       5/5                                               L      5/5      5/5       5/5        5/5    Sensory examination: Intact to light touch and pinprick, pain, temperature and vibration in all extremities.  Reflexes: 2+ b/l biceps, triceps, patella and achilles.  Plantar response downgoing b/l.  Cerebellum: FTN/HKS intact.  No dysmetria.  Gait narrow based and normal.    LABS:                        15.6   5.89  )-----------( 254      ( 19 Aug 2024 14:05 )             45.7     08-19    142  |  105  |  13  ----------------------------<  100<H>  3.9   |  29  |  0.89    Ca    9.7      19 Aug 2024 14:05  Mg     2.0     08-19    TPro  8.2  /  Alb  4.4  /  TBili  0.6  /  DBili  x   /  AST  16  /  ALT  27  /  AlkPhos  81  08-19    Hemoglobin A1C:   Vitamin B12   PT/INR - ( 19 Aug 2024 14:05 )   PT: 11.2 sec;   INR: 0.99          PTT - ( 19 Aug 2024 14:05 )  PTT:33.7 sec  CAPILLARY BLOOD GLUCOSE      Urinalysis Basic - ( 19 Aug 2024 14:05 )    Color: x / Appearance: x / SG: x / pH: x  Gluc: 100 mg/dL / Ketone: x  / Bili: x / Urobili: x   Blood: x / Protein: x / Nitrite: x   Leuk Esterase: x / RBC: x / WBC x   Sq Epi: x / Non Sq Epi: x / Bacteria: x      RADIOLOGY, EKG AND ADDITIONAL TESTS:  CTH no abnormalities  CTA no stenosis, occlusion, aneurysms

## 2024-08-19 NOTE — ED ADULT NURSE NOTE - CHIEF COMPLAINT QUOTE
BIBEMS as transfer from [facility]: c/o intermittent left sided paraesthesias, had a negative CT and preliminary stroke workup, sent here for MRI and neuro c/s for dispo.

## 2024-08-20 ENCOUNTER — NON-APPOINTMENT (OUTPATIENT)
Age: 24
End: 2024-08-20

## 2024-08-20 VITALS
DIASTOLIC BLOOD PRESSURE: 78 MMHG | HEART RATE: 77 BPM | OXYGEN SATURATION: 99 % | TEMPERATURE: 98 F | RESPIRATION RATE: 16 BRPM | SYSTOLIC BLOOD PRESSURE: 130 MMHG

## 2024-08-20 PROBLEM — Z00.00 ENCOUNTER FOR PREVENTIVE HEALTH EXAMINATION: Status: ACTIVE | Noted: 2024-08-20

## 2024-08-20 NOTE — ED PROVIDER NOTE - OBJECTIVE STATEMENT
24 yr old male, no medical hx, presents to the Emergency Department as transfer from [facility] for paresthesia. recent travel to [place], bitten by wild cat, was seen at the time and given rabies vaccine. since that time has had intermittently L sided numbness / tingling. mostly L face, sometimes L arm and L leg. in past two days tingling has been more frequent so went to ED. prior to transfer had cth/cta imaging and labs that were unremarkable. tx for neuro eval and MRI.   cat bite / scratch well healed. no fever, uri sx, cp, sob, abd pain, n/v/d, urinary symptoms. no headache, vision changes, neck pain, extremity weakness, speech or gait changes, dizziness.

## 2024-08-20 NOTE — ED PROVIDER NOTE - CARE PROVIDER_API CALL
Rukhsana Ramos  Neurology  [address] [address]  Phone: [phone]  Fax: [phone]  Follow Up Time:

## 2024-08-20 NOTE — ED PROVIDER NOTE - NSFOLLOWUPINSTRUCTIONS_ED_ALL_ED_FT
Drink plenty of fluids. Get plenty of rest.     Follow up with NEUROLOGY and your PMD within one week.     Return to the Emergency Department for persistent, worsening or new symptoms including, sudden severe headache, change in vision, syncope or loss of consciousness, a facial droop, weakness of one side of your body or one of your extremities, uncontrollable nausea and vomiting, chest pain, shortness of breath, if you cannot keep down food/liquid, if your fever is very high and cannot be controlled by over the counter medication, if you pass out or lose consciousness, feel palpitations, chest pain, or shortness of breath, or if you have any other serious concerns.

## 2024-08-20 NOTE — ED PROVIDER NOTE - CLINICAL SUMMARY MEDICAL DECISION MAKING FREE TEXT BOX
recent travel to [place], bitten by wild cat, was seen at the time and given rabies vaccine. since that time has had intermittently L sided numbness / tingling. mostly L face, sometimes L arm and L leg. tx from [facility] for neuro eval / mri imaging. no associated infectious or neuro sx.   pt well appearing, stable vitals, exam w subjective dec sensation L forehead, exam otherwise unremarkable, neuro intact.     workup from [facility] reviewed.   case was discussed w stroke team prior to transfer and did not feel stroke eval / imaging was required. recommended gen neuro eval  discussed w neuro on arrival. will check mri brain w/wo contrast.     MRI w/o acute findings.  cleared from neuro perspective   discussed w pt, reassured by unremarkable imaging. remains neuro intact. asymptomatic at this time. stable for dc and outpt follow up    All results reviewed with the patient verbally. Discharge plan and return precautions d/w pt who verbalized understanding and agrees with plan. All questions answered. Vitals WNL. Ready for d/c.

## 2024-08-20 NOTE — ED PROVIDER NOTE - PHYSICAL EXAMINATION
Constitutional : Well appearing, non-toxic, no acute distress. awake, alert, oriented to person, place, time/situation.  Head : head normocephalic, atraumatic  EENMT : eyes clear bilaterally, PERRL, EOMI. airway patent. moist mucous membranes. neck supple.  Cardiac : Normal rate, regular rhythm. No murmur appreciated, no LE edema.  Resp : Breath sounds clear and equal bilaterally. Respirations even and unlabored.   Gastro : abdomen soft, nontender, nondistended. no rebound or guarding.   MSK :  range of motion is not limited, no muscle or joint tenderness  Back : No evidence of trauma.   Vasc : Extremities warm and well perfused. 2+ radial and DP pulses. cap refill <2 seconds  Neuro : A&Ox3, CNII-XII grossly intact. visual fields intact, no nystagmus, normal speech and word-finding. 5/5 motor in UE and LE, sensation intact throughout. subjective dec sensation L forehead. ambulating with a steady gait, normal tandem gait. normal finger to nose. negative Romberg, no pronator drift   Skin : Skin normal color for race, warm, dry and intact. No evidence of rash.  Psych : Alert and oriented to person, place, time/situation. normal mood and affect. no apparent risk to self or others.

## 2024-08-20 NOTE — ED PROVIDER NOTE - PATIENT PORTAL LINK FT
You can access the FollowMyHealth Patient Portal offered by [facility] by registering at the following website: [email or web address]. By joining [employer] [employer]’s FollowMyHealth portal, you will also be able to view your health information using other applications (apps) compatible with our system.

## 2025-05-19 NOTE — ED PROVIDER NOTE - OBJECTIVE STATEMENT
Called patient to schedule surgery with Dr. Ruiz, no answer. Callback number [phone] left on vm.     Janelle Chaidez on 5/19/2025 at 11:38 AM   24-year-old male, presents to the ED for his fourth and final rabies vaccination.  Patient denies any acute medical complaints at this time.

## 2025-07-08 NOTE — ED PROVIDER NOTE - NS_EDPROVIDERDISPOUSERTYPE_ED_A_ED
[facility] - [place], [place] - [address]   [address] [facility] [address]  Phone: [phone] Fax: [phone]         Looking to confirm TAC dosage       Attending Attestation (For Attendings USE Only)...